# Patient Record
Sex: FEMALE | Race: BLACK OR AFRICAN AMERICAN
[De-identification: names, ages, dates, MRNs, and addresses within clinical notes are randomized per-mention and may not be internally consistent; named-entity substitution may affect disease eponyms.]

---

## 2020-01-01 ENCOUNTER — HOSPITAL ENCOUNTER (EMERGENCY)
Dept: HOSPITAL 97 - ER | Age: 30
Discharge: HOME | End: 2020-01-01
Payer: SELF-PAY

## 2020-01-01 VITALS — SYSTOLIC BLOOD PRESSURE: 103 MMHG | OXYGEN SATURATION: 97 % | DIASTOLIC BLOOD PRESSURE: 71 MMHG

## 2020-01-01 VITALS — TEMPERATURE: 99.5 F

## 2020-01-01 DIAGNOSIS — J06.9: Primary | ICD-10-CM

## 2020-01-01 PROCEDURE — 87070 CULTURE OTHR SPECIMN AEROBIC: CPT

## 2020-01-01 PROCEDURE — 87081 CULTURE SCREEN ONLY: CPT

## 2020-01-01 PROCEDURE — 99283 EMERGENCY DEPT VISIT LOW MDM: CPT

## 2020-01-01 PROCEDURE — 87804 INFLUENZA ASSAY W/OPTIC: CPT

## 2020-01-01 NOTE — EDPHYS
Physician Documentation                                                                           

 [facility]                                                                 

Name: Rhina Palmer                                                                               

Age: 29 yrs                                                                                       

Sex: Female                                                                                       

: 1990                                                                                   

MRN: [id number]                                                                                   

Arrival Date: 2020                                                                          

Time: 09:03                                                                                       

Account#: [id number]                                                                            

Bed 14                                                                                            

Private MD:                                                                                       

ED Physician Xi Mejia                                                                    

HPI:                                                                                              

                                                                                             

10:54 This 29 yrs old Black Female presents to ER via Ambulatory with complaints of Flu       kb  

      Symptoms.                                                                                   

10:53 The patient or guardian reports cough, that is intermittent, described as moderate,     kb  

      with no sputum, flu symptoms.                                                               

10:54 Onset: The symptoms/episode began/occurred 2 day(s) ago. Severity of symptoms: At their kb  

      worst the symptoms were moderate, in the emergency department the symptoms are              

      unchanged. Modifying factors: The symptoms are alleviated by nothing, the symptoms are      

      aggravated by nothing. Associated signs and symptoms: Pertinent positives: fever,           

      rhinorrhea, sore throat. The patient has not experienced similar symptoms in the past.      

      The patient has not recently seen a physician.                                              

                                                                                                  

Historical:                                                                                       

- Allergies:                                                                                      

09:19 No Known Allergies;                                                                     sv  

- PMHx:                                                                                           

09:19 Hypothyroidism;                                                                         sv  

- PSHx:                                                                                           

09:19 None;                                                                                   sv  

                                                                                                  

- Immunization history:: Flu vaccine is not up to date.                                           

- Social history:: Smoking status: Patient/guardian denies using tobacco.                         

- Ebola Screening: : No symptoms or risks identified at this time.                                

                                                                                                  

                                                                                                  

ROS:                                                                                              

10:52 Neck: Negative for injury, pain, and swelling, Cardiovascular: Negative for chest pain, kb  

      palpitations, and edema, Abdomen/GI: Negative for abdominal pain, nausea, vomiting,         

      diarrhea, and constipation, Back: Negative for injury and pain, MS/Extremity: Negative      

      for injury and deformity, Skin: Negative for injury, rash, and discoloration, Neuro:        

      Negative for headache, weakness, numbness, tingling, and seizure.                           

10:52 Constitutional: Positive for body aches, chills, fatigue, fever, malaise.                   

10:52 ENT: Positive for rhinorrhea, sinus congestion.                                             

10:52 Respiratory: Positive for cough, Negative for dyspnea on exertion, hemoptysis,              

      orthopnea, pleurisy, shortness of breath, sputum production, wheezing.                      

                                                                                                  

Exam:                                                                                             

10:52 Constitutional:  This is a well developed, well nourished patient who is awake, alert,  kb  

      and in no acute distress. Head/Face:  Normocephalic, atraumatic. Neck:  Trachea             

      midline, no thyromegaly or masses palpated, and no cervical lymphadenopathy.  Supple,       

      full range of motion without nuchal rigidity, or vertebral point tenderness.  No            

      Meningismus. Chest/axilla:  Normal chest wall appearance and motion.  Nontender with no     

      deformity.  No lesions are appreciated. Cardiovascular:  Regular rate and rhythm with a     

      normal S1 and S2.  No gallops, murmurs, or rubs.  Normal PMI, no JVD.  No pulse             

      deficits. Respiratory:  Lungs have equal breath sounds bilaterally, clear to                

      auscultation and percussion.  No rales, rhonchi or wheezes noted.  No increased work of     

      breathing, no retractions or nasal flaring. Abdomen/GI:  Soft, non-tender, with normal      

      bowel sounds.  No distension or tympany.  No guarding or rebound.  No evidence of           

      tenderness throughout. Skin:  Warm, dry with normal turgor.  Normal color with no           

      rashes, no lesions, and no evidence of cellulitis. MS/ Extremity:  Pulses equal, no         

      cyanosis.  Neurovascular intact.  Full, normal range of motion. Neuro:  Awake and           

      alert, GCS 15, oriented to person, place, time, and situation.  Cranial nerves II-XII       

      grossly intact.  Motor strength 5/5 in all extremities.  Sensory grossly intact.            

      Cerebellar exam normal.  Normal gait.                                                       

                                                                                                  

Vital Signs:                                                                                      

09:19  / 108; Pulse 99; Resp 20; Temp 99.5; Pulse Ox 99% ; Weight 127.01 kg; Height 5   sv  

      ft. 6 in. (167.64 cm);                                                                      

10:18  / 71; Pulse 88; Resp 18; Pulse Ox 97% on R/A;                                    aj1 

09:19 Body Mass Index 45.19 (127.01 kg, 167.64 cm)                                            sv  

                                                                                                  

MDM:                                                                                              

09:11 Patient medically screened.                                                             kb  

10:03 Data reviewed: vital signs, nurses notes. Data interpreted: Pulse oximetry: on room air kb  

      is 99 %. Interpretation: normal. Counseling: I had a detailed discussion with the           

      patient and/or guardian regarding: the historical points, exam findings, and any            

      diagnostic results supporting the discharge/admit diagnosis, lab results, the need for      

      outpatient follow up, a family practitioner, to return to the emergency department if       

      symptoms worsen or persist or if there are any questions or concerns that arise at home.    

                                                                                                  

                                                                                             

09:17 Order name: Flu; Complete Time: 09:58                                                   kb  

                                                                                             

09:17 Order name: Strep; Complete Time: 09:58                                                 kb  

                                                                                             

09:54 Order name: Throat Culture                                                              EDMS

                                                                                                  

Administered Medications:                                                                         

No medications were administered                                                                  

                                                                                                  

                                                                                                  

Disposition:                                                                                      

13:37 Co-signature as Attending Physician, Xi Mejia MD.                               ma2 

                                                                                                  

Disposition:                                                                                      

20 10:05 Discharged to Home. Impression: Acute upper respiratory infection, unspecified.    

- Condition is Stable.                                                                            

- Discharge Instructions: Upper Respiratory Infection, Adult, Easy-to-Read, Viral                 

  Respiratory Infection, Easy-To-Read.                                                            

                                                                                                  

- Medication Reconciliation Form, Thank You Letter, Antibiotic Education, Prescription            

  Opioid Use, Work release form form.                                                             

- Follow up: Emergency Department; When: As needed; Reason: Worsening of condition.               

  Follow up: Private Physician; When: 2 - 3 days; Reason: Recheck today's complaints,             

  Continuance of care, Re-evaluation by your physician.                                           

                                                                                                  

                                                                                                  

                                                                                                  

Signatures:                                                                                       

Dispatcher [employer]                           EDNoemi Scherer, GHASSAN COVARRUBIAS-Sherie Velasquez RN                     RN   aj1                                                  

Charisse Rodriguez RN RN sv Alzahri, Mohammad, MD MD   ma2                                                  

                                                                                                  

Corrections: (The following items were deleted from the chart)                                    

10:19 10:05 2020 10:05 Discharged to Home. Impression: Acute upper respiratory          aj1 

      infection, unspecified. Condition is Stable. Discharge Instructions: Upper Respiratory      

      Infection, Adult, Easy-to-Read, Viral Respiratory Infection, Easy-To-Read. Forms are        

      Medication Reconciliation Form, Thank You Letter, Antibiotic Education, Prescription        

      Opioid Use. Follow up: Emergency Department; When: As needed; Reason: Worsening of          

      condition. Follow up: Private Physician; When: 2 - 3 days; Reason: Recheck today's          

      complaints, Continuance of care, Re-evaluation by your physician. kb                        

                                                                                                  

**************************************************************************************************

## 2020-01-01 NOTE — ER
Nurse's Notes                                                                                     

 [facility]                                                                 

Name: Rhina Palmer                                                                               

Age: 29 yrs                                                                                       

Sex: Female                                                                                       

: 1990                                                                                   

MRN: [id number]                                                                                   

Arrival Date: 2020                                                                          

Time: 09:03                                                                                       

Account#: [id number]                                                                            

Bed 14                                                                                            

Private MD:                                                                                       

Diagnosis: Acute upper respiratory infection, unspecified                                         

                                                                                                  

Presentation:                                                                                     

                                                                                             

09:17 Presenting complaint: Patient states: productive cough, chest pain with cough, frontal  sv  

      headache, body aches, SOB, fatigue x 2 days. Transition of care: patient was not            

      received from another setting of care. Onset of symptoms was 2019. Risk        

      Assessment: Do you want to hurt yourself or someone else? Patient reports no desire to      

      harm self or others. Initial Sepsis Screen: Does the patient meet any 2 criteria? No.       

      Patient's initial sepsis screen is negative. Does the patient have a suspected source       

      of infection? Yes: Productive cough/pneumonia. Care prior to arrival: None.                 

09:17 Method Of Arrival: Ambulatory                                                           sv  

09:17 Acuity: LUI 4                                                                           sv  

                                                                                                  

Triage Assessment:                                                                                

09:17 General: Appears in no apparent distress. uncomfortable, well groomed, well developed,  sv  

      Behavior is calm, cooperative, appropriate for age. General: Reports fatigue for 1-2        

      days, and body aches. Pain: Complains of pain in throat Pain currently is 3 out of 10       

      on a pain scale. EENT: Reports pain in throat when swallowing or when coughing. Neuro:      

      Level of Consciousness is awake, alert, obeys commands, Oriented to person, place,          

      time, situation, Moves all extremities. Full function. Respiratory: Reports cough that      

      is productive, Airway is patent Respiratory effort is even, unlabored, Respiratory          

      pattern is regular, symmetrical. Derm: Skin is pink, warm \T\ dry. Musculoskeletal: Range   

      of motion: intact in all extremities.                                                       

                                                                                                  

Historical:                                                                                       

- Allergies:                                                                                      

09:19 No Known Allergies;                                                                     sv  

- PMHx:                                                                                           

09:19 Hypothyroidism;                                                                         sv  

- PSHx:                                                                                           

09:19 None;                                                                                   sv  

                                                                                                  

- Immunization history:: Flu vaccine is not up to date.                                           

- Social history:: Smoking status: Patient/guardian denies using tobacco.                         

- Ebola Screening: : No symptoms or risks identified at this time.                                

                                                                                                  

                                                                                                  

Screenin:20 Abuse screen: Denies threats or abuse. Denies injuries from another. Nutritional        sv  

      screening: No deficits noted. Tuberculosis screening: No symptoms or risk factors           

      identified. Fall Risk None identified.                                                      

                                                                                                  

Assessment:                                                                                       

10:18 Reassessment: Patient appears in no apparent distress at this time. No changes from     aj1 

      previously documented assessment. Patient and/or family updated on plan of care and         

      expected duration. Pain level reassessed. Patient is alert, oriented x 3, equal             

      unlabored respirations, skin warm/dry/pink.                                                 

                                                                                                  

Vital Signs:                                                                                      

09:19  / 108; Pulse 99; Resp 20; Temp 99.5; Pulse Ox 99% ; Weight 127.01 kg; Height 5   sv  

      ft. 6 in. (167.64 cm);                                                                      

10:18  / 71; Pulse 88; Resp 18; Pulse Ox 97% on R/A;                                    aj1 

09:19 Body Mass Index 45.19 (127.01 kg, 167.64 cm)                                            sv  

                                                                                                  

ED Course:                                                                                        

09:03 Patient arrived in ED.                                                                  as  

09:04 [facility]                                                        kb  

09:04 Xi Mejia MD is Attending Physician.                                           kb  

09:17 Charisse Rodriguez, RN is Primary Nurse.                                                  sv  

09:18 Triage completed.                                                                       sv  

09:20 Arm band placed on Patient placed in an exam room, on a stretcher.                      sv  

09:20 Patient has correct armband on for positive identification. Bed in low position. Call   sv  

      light in reach. Pulse ox on. NIBP on. Door closed. Head of bed elevated.                    

09:20 Flu and/or RSV swab sent to lab. Strep swab sent to lab.                                sv  

09:35 Awaiting lab results.                                                                   sv  

10:00 Report given to Sherie FREEMAN.                                                              sv  

10:18 No provider procedures requiring assistance completed. Patient did not have IV access   aj1 

      during this emergency room visit.                                                           

                                                                                                  

Administered Medications:                                                                         

No medications were administered                                                                  

                                                                                                  

                                                                                                  

Outcome:                                                                                          

10:05 Discharge ordered by MD.                                                                kb  

10:19 Discharged to home ambulatory.                                                          aj1 

10:19 Condition: good                                                                             

10:19 Discharge instructions given to patient, Instructed on discharge instructions, follow       

      up and referral plans. Demonstrated understanding of instructions, follow-up care.          

10:19 Patient left the ED.                                                                    aj1 

                                                                                                  

Signatures:                                                                                       

Noemi Arteaga FNP-C FNP-Ckb Johnson, Angela, RN RN   aj1                                                  

Charisse Rodriguez, PETE                    RN   Pratima Ross                             as                                                   

                                                                                                  

**************************************************************************************************